# Patient Record
Sex: FEMALE | Race: WHITE | NOT HISPANIC OR LATINO | Employment: STUDENT | URBAN - METROPOLITAN AREA
[De-identification: names, ages, dates, MRNs, and addresses within clinical notes are randomized per-mention and may not be internally consistent; named-entity substitution may affect disease eponyms.]

---
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
## 2020-02-28 ENCOUNTER — OFFICE VISIT - HEALTHEAST (OUTPATIENT)
Dept: FAMILY MEDICINE | Facility: CLINIC | Age: 20
End: 2020-02-28

## 2020-02-28 DIAGNOSIS — L01.00 IMPETIGO: ICD-10-CM

## 2020-02-28 ASSESSMENT — MIFFLIN-ST. JEOR: SCORE: 1762.89

## 2020-10-12 ENCOUNTER — COMMUNICATION - HEALTHEAST (OUTPATIENT)
Dept: SCHEDULING | Facility: CLINIC | Age: 20
End: 2020-10-12

## 2020-10-12 DIAGNOSIS — Z30.019 ENCOUNTER FOR INITIAL PRESCRIPTION OF CONTRACEPTIVES, UNSPECIFIED CONTRACEPTIVE: ICD-10-CM

## 2021-03-22 ENCOUNTER — COMMUNICATION - HEALTHEAST (OUTPATIENT)
Dept: PHARMACY | Facility: CLINIC | Age: 21
End: 2021-03-22

## 2021-03-22 DIAGNOSIS — Z30.019 ENCOUNTER FOR INITIAL PRESCRIPTION OF CONTRACEPTIVES, UNSPECIFIED CONTRACEPTIVE: ICD-10-CM

## 2021-05-31 VITALS — BODY MASS INDEX: 26.22 KG/M2 | HEIGHT: 69 IN | WEIGHT: 177 LBS

## 2021-05-31 NOTE — TELEPHONE ENCOUNTER
Patient Returning Call  Reason for call:  Message from clinic  Information relayed to patient:  Message from Maureen Mac CNP sent at 8/22/2019 10:06 PM CDT -----  Your labs came back negative for celiac disease. Please let us know if symptoms worsen or fail to improve.   Patient has additional questions:  No  If YES, what are your questions/concerns:  n/a  Okay to leave a detailed message?: No call back needed

## 2021-05-31 NOTE — TELEPHONE ENCOUNTER
----- Message from Maureen Mac CNP sent at 8/22/2019 10:06 PM CDT -----  Your labs came back negative for celiac disease. Please let us know if symptoms worsen or fail to improve.

## 2021-05-31 NOTE — PROGRESS NOTES
Assessment/Plan:    1. Vomiting, intractability of vomiting not specified, presence of nausea not specified, unspecified vomiting type  Intermittent vomiting after eating over the last couple of months without any accompanying symptoms.  Exam today is unremarkable.  Discussed broad differentials with the patient.  She prefers to wait on any extensive work-up at this time and is requesting only a celiac test only today.  We will follow-up with patient regarding results when available.  Referral placed to gastroenterology for further evaluation in the event that symptoms do not improve.  - Celiac(Gluten)Antibody Panel  - Ambulatory referral to Gastroenterology  -Urine pregnancy    2. Encounter for initial prescription of contraceptives, unspecified contraceptive  Discussed options for contraceptive management.  She prefers combination oral contraceptive pills.  Discussed importance of safe sex practices.  Will obtain pregnancy test today.  - norgestimate-ethinyl estradiol (ORTHO TRI-CYCLEN LO, 28,) 0.18/0.215/0.25 mg-25 mcg Tab tablet; Take 1 tablet by mouth daily.  Dispense: 3 Package; Refill: 4  - Pregnancy (Beta-hCG, Qual), Urine    3. Healthcare maintenance  - HPV vaccine 9 valent 3 dose IM    Subjective:    Kelley Martin is a 18 year old female seen today for evaluation of vomiting after eating.  This is been happening intermittently for the last couple of months.  Vomiting is nonprojectile.  Between episodes, she is completely asymptomatic.  Over the last week symptoms seem to be getting a little bit better.  She is concerned about a possible food allergy.  Symptoms come on shortly after eating, within 1/2-hour typically.  She gets a slight pain in her abdomen and vomits shortly after.  Vomiting does relieve the symptoms.  She denies any symptoms of heartburn.  No fevers, chills or other systemic symptoms associated with it.  She has not vomited for the past week and feels that symptoms may be improving.   "She is requesting to be checked for celiac disease today.  She notices that symptoms have been more so with meals that are heavy and carbs.  She denies throwing up blood.  No change in her stools.  No diarrhea or constipation.  No lower abdominal pain.  Stools are not black.  No flank pain.  No recent travel.  No sick contacts with similar symptoms.  She denies any change in diet recently.    Patient would also like to discuss contraceptive options.  She is most interested in the birth control pill.  She will be going away to college.  Recently became sexually active.  She denies any chance for pregnancy and is currently on her period today.  Has not been on any contraception in the past.  Periods are regular for the most part.  No history of strokes in the family.  She does not smoke but occasionally vapes.  Per chart review, she is due for her third dose of HPV vaccine.  She is interested in getting this done today. Review of systems is as stated in HPI, and the remainder of the 10 system review is otherwise unremarkable.    Past Medical History, Family History, and Social History reviewed.    History reviewed. No pertinent surgical history.     No family history on file.     History reviewed. No pertinent past medical history.     Social History     Tobacco Use     Smoking status: Never Smoker     Smokeless tobacco: Never Used   Substance Use Topics     Alcohol use: No     Drug use: No        Current Outpatient Medications   Medication Sig Dispense Refill     norgestimate-ethinyl estradiol (ORTHO TRI-CYCLEN LO, 28,) 0.18/0.215/0.25 mg-25 mcg Tab tablet Take 1 tablet by mouth daily. 3 Package 4     No current facility-administered medications for this visit.           Objective:    Vitals:    08/20/19 1326   BP: 120/76   Patient Site: Left Arm   Patient Position: Sitting   Cuff Size: Adult Regular   Pulse: 76   Weight: 196 lb 3.2 oz (89 kg)   Height: 5' 9\" (1.753 m)      Body mass index is 28.97 " kg/m .      General Appearance:  Alert, cooperative, no distress, appears stated age   Neck: Supple, symmetrical, no adenopathy.   Lungs:   Clear to auscultation bilaterally, respirations unlabored.  No expiratory wheeze or inspiratory crackles noted.   Heart:  Regular rate and rhythm, S1, S2 normal, no murmur, rub or gallop   Abdomen:   Soft, non-tender, positive bowel sounds, no masses, no organomegaly   Skin: Warm, dry.  Skin color, texture, turgor normal, no rashes or lesions         This note has been dictated using voice recognition software. Any grammatical or context distortions are unintentional and inherent to the use of this software.

## 2021-06-02 VITALS — WEIGHT: 185.1 LBS | BODY MASS INDEX: 27.42 KG/M2 | HEIGHT: 69 IN

## 2021-06-02 VITALS — HEIGHT: 69 IN | WEIGHT: 183.4 LBS | BODY MASS INDEX: 27.16 KG/M2

## 2021-06-03 VITALS — HEIGHT: 69 IN | WEIGHT: 196.2 LBS | BODY MASS INDEX: 29.06 KG/M2

## 2021-06-04 VITALS
OXYGEN SATURATION: 100 % | DIASTOLIC BLOOD PRESSURE: 62 MMHG | BODY MASS INDEX: 30.32 KG/M2 | HEART RATE: 98 BPM | HEIGHT: 69 IN | WEIGHT: 204.7 LBS | SYSTOLIC BLOOD PRESSURE: 128 MMHG

## 2021-06-06 NOTE — PROGRESS NOTES
Assessment/Plan:    1. Impetigo  Lesion on chin is not classic appearing for impetigo however, given characteristics of some purulent drainage the other day, will treat with topical antibiotic as noted.  Recommend that she apply to affected area 3 times a day.  Also recommend gentle face wash and adequate handwashing.  She will follow-up if she develops any worsening sores or lesions.  - mupirocin (BACTROBAN) 2 % ointment; Apply to affected area 3 times daily  Dispense: 22 g; Refill: 0    Subjective:    Kelley Martin is a 19 year old female seen today for evaluation of a sore on her face.  She is accompanied by her mom.  Patient states that she developed a scab at looking sore about 5 days ago on her chin.  There was no injury or pimple that she noticed.  It hurt initially but now has scabbed over and is not bothering her as much.  2 days ago, the scab broke open and there was some purulent drainage and crusting.  She has not been putting any topical medication on it.  Has been using her normal make-up, face wash and lotion.  She denies any sores or lesions elsewhere on her body.  She does not typically have pimples or acne which is what concerned her about this.  It is occasionally itchy.  It blood initially when scab fell off but no bleeding since.  She denies any recent illness.  No fevers, chills or other systemic symptoms.  No close contacts with similar lesions. Review of systems is as stated in HPI, and the remainder of the 10 system review is otherwise unremarkable.    Past Medical History, Family History, and Social History reviewed.    No past surgical history on file.     No family history on file.     No past medical history on file.     Social History     Tobacco Use     Smoking status: Never Smoker     Smokeless tobacco: Never Used   Substance Use Topics     Alcohol use: No     Drug use: No        Current Outpatient Medications   Medication Sig Dispense Refill     norgestimate-ethinyl estradiol  "(ORTHO TRI-CYCLEN LO, 28,) 0.18/0.215/0.25 mg-25 mcg Tab tablet Take 1 tablet by mouth daily. 3 Package 4     mupirocin (BACTROBAN) 2 % ointment Apply to affected area 3 times daily 22 g 0     No current facility-administered medications for this visit.           Objective:    Vitals:    02/28/20 1522   BP: 128/62   Patient Site: Left Arm   Patient Position: Sitting   Cuff Size: Adult Regular   Pulse: 98   SpO2: 100%   Weight: 204 lb 11.2 oz (92.9 kg)   Height: 5' 9\" (1.753 m)      Body mass index is 30.23 kg/m .      General Appearance:  Alert, cooperative, no distress, appears stated age   HEENT:  Normal.  No acute findings.   Skin: Warm, dry.  Patient has a dry appearing lesion approximately size of a marble located on her chin.  It is somewhat crusty and dry in appearance.  No purulent drainage or bleeding noted at currently.  No tenderness.  No rashes or lesions elsewhere.           This note has been dictated using voice recognition software. Any grammatical or context distortions are unintentional and inherent to the use of this software.     "

## 2021-06-12 NOTE — TELEPHONE ENCOUNTER
Refill Approved    Rx renewed per Medication Renewal Policy. Medication was last renewed on 8/20/19.    [facility] Connection Triage/Med Refill 10/12/2020     Requested Prescriptions   Pending Prescriptions Disp Refills     norgestimate-ethinyl estradioL (ORTHO TRI-CYCLEN LO, 28,) 0.18/0.215/0.25 mg-25 mcg tablet 3 Package 4     Sig: Take 1 tablet by mouth daily.       Oral Contraceptives Protocol Passed - 10/12/2020  7:48 PM        Passed - Visit with PCP or prescribing provider visit in last 12 months      Last office visit with prescriber/PCP: Visit date not found OR same dept: Visit date not found OR same specialty: Visit date not found  Last physical: Visit date not found Last MTM visit: Visit date not found   Next visit within 3 mo: Visit date not found  Next physical within 3 mo: Visit date not found  Prescriber OR PCP: Denise Rodriguez RN  Last diagnosis associated with med order: 1. Encounter for initial prescription of contraceptives, unspecified contraceptive  - norgestimate-ethinyl estradioL (ORTHO TRI-CYCLEN LO, 28,) 0.18/0.215/0.25 mg-25 mcg tablet; Take 1 tablet by mouth daily.  Dispense: 3 Package; Refill: 4    If protocol passes may refill for 12 months if within 3 months of last provider visit (or a total of 15 months).

## 2021-06-15 ENCOUNTER — COMMUNICATION - HEALTHEAST (OUTPATIENT)
Dept: TELEHEALTH | Facility: CLINIC | Age: 21
End: 2021-06-15

## 2021-06-15 ENCOUNTER — OFFICE VISIT - HEALTHEAST (OUTPATIENT)
Dept: FAMILY MEDICINE | Facility: CLINIC | Age: 21
End: 2021-06-15

## 2021-06-15 DIAGNOSIS — Z00.00 HEALTH CARE MAINTENANCE: ICD-10-CM

## 2021-06-15 DIAGNOSIS — Z30.019 ENCOUNTER FOR INITIAL PRESCRIPTION OF CONTRACEPTIVES, UNSPECIFIED CONTRACEPTIVE: ICD-10-CM

## 2021-06-15 RX ORDER — NORGESTIMATE AND ETHINYL ESTRADIOL 7DAYSX3 LO
1 KIT ORAL DAILY
Qty: 3 PACKAGE | Refills: 3 | Status: SHIPPED | OUTPATIENT
Start: 2021-06-15 | End: 2022-05-14

## 2021-06-15 ASSESSMENT — MIFFLIN-ST. JEOR: SCORE: 1794.19

## 2021-06-16 PROBLEM — B27.90 MONONUCLEOSIS: Status: ACTIVE | Noted: 2019-02-05

## 2021-06-16 LAB
C TRACH DNA SPEC QL NAA+PROBE: NEGATIVE
SPECIMEN DESCRIPTION: NORMAL

## 2021-06-16 NOTE — TELEPHONE ENCOUNTER
Received refill request for Tri-Lo-Sprintec. Last seen over a year ago. Will send 3 month supply, then patient is due to see PCP.

## 2021-06-16 NOTE — TELEPHONE ENCOUNTER
Left message to call back for: PT  Information to relay to patient:  Left message to call and schedule ofv with dr bullard before next refill :TRI -LO SPRINTEC.

## 2021-06-17 NOTE — PATIENT INSTRUCTIONS - HE
Patient Instructions by Maureen Mac CNP at 2/5/2019  1:50 PM     Author: Maureen Mac CNP Service: -- Author Type: Nurse Practitioner    Filed: 2/5/2019  3:00 PM Encounter Date: 2/5/2019 Status: Addendum    : Maureen Mac CNP (Nurse Practitioner)    Related Notes: Original Note by Maureen Mac CNP (Nurse Practitioner) filed at 2/5/2019  2:56 PM       Patient Education   Patient Education     Mononucleosis  Mononucleosis (also called mono) is a contagious viral infection. Most infants and children exposed to the virus get only mild flu-like symptoms or no symptoms at all. However, infection is usually more serious in teens and young adults. While the virus is active it causes symptoms and can spread to others. After symptoms subside, the virus stays in the body and eventually becomes inactive. Once you have one case of mono, you are unlikely to develop symptoms again.  The virus is usually spread by contact with saliva, often by kissing. It may also spread by breastmilk, blood, or sexual contact. It takes about 4 to 6 weeks to develop symptoms after exposure.  Early symptoms include headache, nausea, tiredness and general muscle aching. This is followed by sore throat and fever. Lymph glands in the neck, under the arms, or in the groin may be swollen. Symptoms usually go away in about 1 to 2 months. But they can last up to four months.  If symptoms have been present less than 1 week or more than 3 weeks, the blood test used to diagnose this disease may be negative even though you have the illness.  In this case, other tests may be done.  Taking the antibiotics ampicillin or amoxicillin during a mono infection may cause a skin rash. This is not serious and will fade in about one week. The cause is a reaction of the drug with the virus.  Mono can cause your spleen to swell. The spleen is a fist-sized organ in the upper left abdomen that stores red blood cells. Injury to a swollen spleen can cause the  spleen to rupture. This can cause life-threatening internal bleeding. To avoid this, do not play contact sports or perform strenuous activity for 8 weeks, or until cleared by your healthcare provider. A sharp blow could rupture a swollen spleen  Home care    Rest in bed until the fever and weakness have gone away.    Drink plenty of fluids, but avoid alcohol. Otherwise, you may eat a regular diet.    Ask your healthcare provider about using over-the-counter medicines to treat symptoms such as fever, pain, or an itchy rash.    Over-the-counter throat lozenges may help soothe a sore throat. Gargling with warm salt water (1/2 teaspoon in 1 glass of warm water) may also be soothing to the throat.    You may return to work or school after the fever goes away and you are feeling better. Continue to follow any activity restrictions you have been given.  Preventing spread of the virus  To limit the spread of the virus, avoid exposing others to your saliva for at least 6 months after your illness (no kissing or sharing utensils, drinking glasses, or toothbrushes).  Follow-up care  Follow up with your healthcare provider within 1 to 2 weeks or as advised by our staff to be sure that there are no complications. If symptoms of extreme fatigue and swollen glands last longer than 6 months, see your healthcare provider for further testing.  When to seek medical advice  Call your healthcare provider right away if any of the following occur:    Excessive coughing    Yellow skin or eyes    Trouble swallowing  Call 911  Call 911 if any of the following occur:    Severe or worsening abdominal pain    Trouble breathing  Date Last Reviewed: 9/25/2015 2000-2017 [employer]. [address] [place] [address], [place] [address]. All rights reserved. This information is not intended as a substitute for professional medical care. Always follow your healthcare professional's instructions.

## 2021-06-18 ENCOUNTER — COMMUNICATION - HEALTHEAST (OUTPATIENT)
Dept: FAMILY MEDICINE | Facility: CLINIC | Age: 21
End: 2021-06-18

## 2021-06-18 NOTE — LETTER
Letter by Maureen Mac CNP at      Author: Maureen Mac CNP Service: -- Author Type: --    Filed:  Encounter Date: 3/11/2019 Status: (Other)       Kelley Martin  [address] [facility] [address]             March 11, 2019         Dear Ms. Martin,    Below are the results from your recent visit:    Resulted Orders   Chlamydia trachomatis & Neisseria gonorrhoeae, Amplified Detection   Result Value Ref Range    Chlamydia trachomatis, Amplified Detection Negative Negative    Neisseria gonorrhoeae, Amplified Detection Negative Negative       Chlamydia and Gonorrheae is negative.    Please call with questions or contact us using [employer].    Sincerely,        Electronically signed by Maureen Mac CNP

## 2021-06-18 NOTE — LETTER
Letter by Maureen Mac CNP at      Author: Maureen Mac CNP Service: -- Author Type: --    Filed:  Encounter Date: 2/5/2019 Status: (Other)       February 5, 2019     Patient: Kelley Martin   YOB: 2000   Date of Visit: 2/5/2019       To Whom it May Concern:    Kelley Martin was seen in my clinic on 2/5/2019. She has been diagnosed with mononucleosis.     If you have any questions or concerns, please don't hesitate to call.    Sincerely,         Electronically signed by Maureen Mac CNP

## 2021-06-23 NOTE — PROGRESS NOTES
Assessment/Plan:    1. Mononucleosis  Monospot is positive today.  Discussed with patient that mononucleosis is a contagious viral infection and typically requires only symptomatic cares.  Encouraged adequate rest and hydration.  Patient may continue to use throat lozenges, Tylenol and Advil for management of sore throat symptoms.  She should avoid any contact sports or any strenuous physical activity for 6 weeks due to possibility of spleen enlargement.  Discussed signs and symptoms of worsening illness to monitor for.  Patient should return to clinic if she develops any new or worsening symptoms.  She understands and is content with this plan.  - Mononucleosis Screen  - Rapid Strep A Screen-Throat  - Group A Strep, RNA Direct Detection, Throat    Subjective:    Kelley Martin is a 18-year-old female seen today for evaluation of a sore throat.  She is accompanied by her mom today.  [facility].  Rapid strep and throat culture were both negative. Patient feels that sore throat is getting worse over the last 4 days.  She has swollen lymph nodes on the side of her neck.  She is able to swallow without difficulty but it is causing a lot of discomfort.  She feels more fatigued than usual.  She denies any cough, shortness of breath or chest pain.  No fevers or chills.  No nasal congestion, ear pain.  She has been taking Tylenol and ibuprofen without much relief.  She denies any recent exposures to strep throat or mono.  She is interested in being tested for mono today.  No significant past medical history.  No additional concerns today. Review of systems is as stated in HPI, and the remainder of the 10 system review is otherwise unremarkable.    Past Medical History, Family History, and Social History reviewed.    History reviewed. No pertinent surgical history.     No family history on file.     History reviewed. No pertinent past medical history.  "    Social History     Tobacco Use     Smoking status: Never Smoker     Smokeless tobacco: Never Used   Substance Use Topics     Alcohol use: No     Drug use: No        No current outpatient medications on file.     No current facility-administered medications for this visit.           Objective:    Vitals:    02/05/19 1359   BP: 120/62   Patient Site: Right Arm   Patient Position: Sitting   Cuff Size: Adult Regular   Pulse: 90   Temp: 98.1  F (36.7  C)   SpO2: 99%   Weight: 183 lb 6.4 oz (83.2 kg)   Height: 5' 9\" (1.753 m)      Body mass index is 27.08 kg/m .      General Appearance:  Alert, cooperative, no distress, appears stated age   HEENT:   Moist mucous membranes.  Oropharynx with erythema and bilateral tonsillar exudate.  No nasal drainage or sinus tenderness to palpation.  Tympanic membranes pearly and translucent bilaterally.  No cough.    Neck:  Right-sided cervical lymphadenopathy present.   Lungs:   Clear to auscultation bilaterally, respirations unlabored.  No expiratory wheeze or inspiratory crackles noted.   Heart:  Regular rate and rhythm, S1, S2 normal, no murmur, rub or gallop   Abdomen:   Soft, non-tender, positive bowel sounds, no masses, no splenomegaly.   Extremities: Extremities normal.  No cyanosis or edema   Skin: Warm, dry.  Skin color, texture, turgor normal, no rashes or lesions         This note has been dictated using voice recognition software. Any grammatical or context distortions are unintentional and inherent to the use of this software.     "

## 2021-06-24 NOTE — PROGRESS NOTES
Assessment:      Satisfactory school sports physical exam.       Plan:      Permission granted to participate in athletics without restrictions. Form signed and returned to patient.  Anticipatory guidance: Specific topics reviewed: bicycle helmets, drugs, ETOH, and tobacco, importance of regular dental care, importance of regular exercise, importance of varied diet, limit TV, media violence, minimize junk food, seat belts and sex; STD and pregnancy prevention.      1. Routine general medical examination at a health care facility  - HPV vaccine 9 valent 3 dose IM  - Chlamydia trachomatis & Neisseria gonorrhoeae, Amplified Detection    Subjective:       Kelley Martin is a 18 y.o. female who presents for a school sports physical exam. Patient/parent deny any current health related concerns.  She plans to participate in softball.  Patient has played softball years ago. Denies any history of head injuries or concussions.  No family history of early cardiac sudden death.  Patient was diagnosed with mono over a month ago.  Reports that she has been feeling better, no continued symptoms of fatigue.  No significant past medical history otherwise.  She does not take any regular medications.  She denies having any other symptoms or concerns today.    Immunization History   Administered Date(s) Administered     DTaP, historic 01/11/2001, 03/08/2001, 05/24/2001, 05/16/2002, 06/07/2006     HPV 9 Valent 08/01/2017     Hep A, historic 07/08/2013, 08/12/2015     Hep B, historic 01/11/2001, 03/08/2001, 05/24/2001     HiB, historic,unspecified 01/11/2001, 03/08/2001, 05/24/2001, 01/17/2002     IPV 01/11/2001, 03/08/2001, 05/24/2001, 06/07/2006     Influenza G4x5-53, 12/18/2009     Influenza, inj, historic,unspecified 11/30/2007, 11/26/2008     Influenza, seasonal,quad inj 6-35 mos 11/30/2007     MMR 01/17/2002, 06/07/2006     Meningococcal MCV4 Conjugate,Unspecified 07/18/2013     Meningococcal MCV4P 07/08/2013, 08/01/2017      "Pneumo Conj 7-V(before 2010) 07/18/2002, 11/07/2002     Tdap 06/12/2013     Varicella 01/17/2002, 08/22/2013       The following portions of the patient's history were reviewed and updated as appropriate: allergies, current medications, past family history, past medical history, past social history, past surgical history and problem list.    Review of Systems  Pertinent items are noted in HPI      Objective:      /60 (Patient Site: Right Arm, Patient Position: Sitting, Cuff Size: Adult Regular)   Pulse 80   Ht 5' 9\" (1.753 m)   Wt 185 lb 1.6 oz (84 kg)   BMI 27.33 kg/m      General Appearance:  Alert, cooperative, no distress, appropriate for age                             Head:  Normocephalic, without obvious abnormality                              Eyes:  PERRL, EOM's intact, conjunctiva and cornea clear, fundi benign, both eyes                              Ears:  TM pearly gray color and semitransparent, external ear canals normal, both ears                             Nose:  Nares symmetrical, septum midline, mucosa pink, clear watery discharge; no    sinus tenderness                           Throat:  Lips, tongue, and mucosa are moist, pink, and intact; teeth intact                              Neck:  Supple; symmetrical, trachea midline, no adenopathy; thyroid: no enlargement,    symmetric, no tenderness/mass/nodules; no carotid bruit, no JVD                              Back:  Symmetrical, no curvature, ROM normal, no CVA tenderness                Chest/Breast:  No mass, tenderness, or discharge                            Lungs:  Clear to auscultation bilaterally, respirations unlabored                              Heart:  Normal PMI, regular rate & rhythm, S1 and S2 normal, no murmurs, rubs, or    gallops                      Abdomen:  Soft, non-tender, bowel sounds active all four quadrants, no mass or     organomegaly               Genitourinary:  Genitalia intact, no discharge, swelling, or " pain          Musculoskeletal:  Tone and strength strong and symmetrical, all extremities; no joint pain or    edema                                        Lymphatic:  No adenopathy              Skin/Hair/Nails:  Skin warm, dry and intact, no rashes or abnormal dyspigmentation                    Neurologic:  Alert and oriented x3, no cranial nerve deficits, normal strength and tone, gait    steady

## 2021-06-26 NOTE — TELEPHONE ENCOUNTER
----- Message from Maureen Mac CNP sent at 6/18/2021  9:20 AM CDT -----  Please notify patient that her chlamydia screen came back negative.

## 2021-06-26 NOTE — PROGRESS NOTES
"    Assessment & Plan     Encounter for initial prescription of contraceptives, unspecified contraceptive  Doing well overall on oral contraceptive pills.  We reviewed other options for birth control as well.  At this point, patient prefers to stay on contraceptive pills.  Refill provided today.  Discussed safe sex practices as well.  She will be due for first Pap smear next year, I encouraged her to schedule annual exam.  Will obtain routine screening for chlamydia today.   - norgestimate-ethinyl estradioL (ORTHO TRI-CYCLEN LO, 28,) 0.18/0.215/0.25 mg-25 mcg tablet  Dispense: 3 Package; Refill: 3    Health care maintenance  - Chlamydia trachomatis by PCR     BMI:   Estimated body mass index is 31.25 kg/m  as calculated from the following:    Height as of this encounter: 5' 9\" (1.753 m).    Weight as of this encounter: 211 lb 9.6 oz (96 kg).   The following high BMI interventions were performed this visit: encouragement to exercise and lifestyle education regarding diet    Return in about 6 months (around 12/15/2021) for Annual Physical Exam.    Maureen Mac, CNP  [facility]   Kelley Martin is 20 y.o. and presents today for the following health issues   HPI   Patient is here today for medication check and follow-up.  She is on oral contraceptive pills.  Doing well however feels that she may have gained some weight since starting the contraceptive pills.  She also notes that her diet has not been the best lately and she is not exercising routinely.  She is hoping to start working on these lifestyle modifications.  She has considered other forms of contraception in the past but is not interested in IUD, Nexplanon or Depo-Provera at this time.  She has been sexually active with one partner but recently broke up with this boyfriend.  He is due for her annual exam and will be due for first Pap smear next year.  She does not have any additional concerns today.    She does not " "smoke cigarettes.  No personal or family history of strokes that she is aware of.    Review of Systems  Review of Systems - pertinent positives noted in HPI, otherwise ROS is negative.        Objective    /64   Pulse 86   Temp 97.1  F (36.2  C) (Tympanic)   Ht 5' 9\" (1.753 m)   Wt 211 lb 9.6 oz (96 kg)   BMI 31.25 kg/m    Body mass index is 31.25 kg/m .  Physical Exam    General Appearance:  Alert, cooperative, no distress, appears stated age   Lungs:   Clear to auscultation bilaterally, respirations unlabored.    Heart:  Regular rate and rhythm, S1, S2 normal, no murmur, rub or gallop   Skin: Warm, dry.                    "

## 2021-07-06 VITALS
HEIGHT: 69 IN | TEMPERATURE: 97.1 F | HEART RATE: 86 BPM | SYSTOLIC BLOOD PRESSURE: 116 MMHG | BODY MASS INDEX: 31.34 KG/M2 | WEIGHT: 211.6 LBS | DIASTOLIC BLOOD PRESSURE: 64 MMHG

## 2021-11-12 ENCOUNTER — TELEPHONE (OUTPATIENT)
Dept: FAMILY MEDICINE | Facility: CLINIC | Age: 21
End: 2021-11-12
Payer: COMMERCIAL

## 2021-11-12 NOTE — TELEPHONE ENCOUNTER
rita- pt has apt with Aleta on Monday and we need to cancel it because she will not be in clinic. I cancelled the apt. When pt calls back we can do a virtual visit if she would like

## 2021-11-23 ENCOUNTER — OFFICE VISIT (OUTPATIENT)
Dept: FAMILY MEDICINE | Facility: CLINIC | Age: 21
End: 2021-11-23
Payer: COMMERCIAL

## 2021-11-23 VITALS
HEART RATE: 64 BPM | DIASTOLIC BLOOD PRESSURE: 62 MMHG | SYSTOLIC BLOOD PRESSURE: 120 MMHG | HEIGHT: 69 IN | BODY MASS INDEX: 25.21 KG/M2 | OXYGEN SATURATION: 100 % | WEIGHT: 170.2 LBS

## 2021-11-23 DIAGNOSIS — R11.2 NAUSEA AND VOMITING, INTRACTABILITY OF VOMITING NOT SPECIFIED, UNSPECIFIED VOMITING TYPE: Primary | ICD-10-CM

## 2021-11-23 LAB
ALBUMIN SERPL-MCNC: 4 G/DL (ref 3.5–5)
ALP SERPL-CCNC: 48 U/L (ref 45–120)
ALT SERPL W P-5'-P-CCNC: 23 U/L (ref 0–45)
ANION GAP SERPL CALCULATED.3IONS-SCNC: 14 MMOL/L (ref 5–18)
AST SERPL W P-5'-P-CCNC: 20 U/L (ref 0–40)
BILIRUB SERPL-MCNC: 0.4 MG/DL (ref 0–1)
BUN SERPL-MCNC: 9 MG/DL (ref 8–22)
CALCIUM SERPL-MCNC: 9.6 MG/DL (ref 8.5–10.5)
CHLORIDE BLD-SCNC: 107 MMOL/L (ref 98–107)
CO2 SERPL-SCNC: 20 MMOL/L (ref 22–31)
CREAT SERPL-MCNC: 0.69 MG/DL (ref 0.6–1.1)
GFR SERPL CREATININE-BSD FRML MDRD: >90 ML/MIN/1.73M2
GLUCOSE BLD-MCNC: 70 MG/DL (ref 70–125)
LIPASE SERPL-CCNC: 19 U/L (ref 0–52)
POTASSIUM BLD-SCNC: 3.7 MMOL/L (ref 3.5–5)
PROT SERPL-MCNC: 7.4 G/DL (ref 6–8)
SODIUM SERPL-SCNC: 141 MMOL/L (ref 136–145)
TSH SERPL DL<=0.005 MIU/L-ACNC: 1.04 UIU/ML (ref 0.3–5)

## 2021-11-23 PROCEDURE — 84443 ASSAY THYROID STIM HORMONE: CPT | Performed by: FAMILY MEDICINE

## 2021-11-23 PROCEDURE — 83690 ASSAY OF LIPASE: CPT | Performed by: FAMILY MEDICINE

## 2021-11-23 PROCEDURE — 80053 COMPREHEN METABOLIC PANEL: CPT | Performed by: FAMILY MEDICINE

## 2021-11-23 PROCEDURE — 99213 OFFICE O/P EST LOW 20 MIN: CPT | Performed by: FAMILY MEDICINE

## 2021-11-23 PROCEDURE — 36415 COLL VENOUS BLD VENIPUNCTURE: CPT | Performed by: FAMILY MEDICINE

## 2021-11-23 ASSESSMENT — MIFFLIN-ST. JEOR: SCORE: 1601.4

## 2021-11-23 NOTE — PROGRESS NOTES
"Kelley was seen today for gi problem.    Diagnoses and all orders for this visit:    Nausea and vomiting, intractability of vomiting not specified, unspecified vomiting type  -     Comprehensive metabolic panel (BMP + Alb, Alk Phos, ALT, AST, Total. Bili, TP)  -     TSH with free T4 reflex  -     Lipase      Patient Instructions   We will check lab work.    Consider an upper GI scope exam and / or gastroenterology referral.    I recommend taking georgie to help with nausea/ vomiting - [email or web address] - Take one twice a day.       SUBJECTIVE: Kelley Martin is a 21 year old female who presents with the following:  Patient presents with:  GI Problem: Vomits after eating for over a year ago  She will vomit after eating for the last year.  Occurs once a day.  No nausea.  She will eat and feel like she needs to vomit but can wait at times.  Usually just vomits once.  No obvious foods.  She was tested for celiac disease which was negative.  She recently lost a lot of weight.  Has been working out more.    ROS: No heartburn.  No abdominal pain.  No dysphagia/ odontophagia.  No constipation / diarrhea.  No anxiety.    SH: Lives with roommates.  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  Studies marketing.    Diet: B- toast / sausage / water.  L- peanut butter and jelly with crackers / snack food.  Snack- smoothie with fruit/ juice.  D- tacos/ pizza / pasta / chicken.  Caffeine - Bucked Up ( energy drink ).  Alcohol - 2 drinks / weeks.  No drug use.    Supplement: Fat burner supplement      OBJECTIVE: /62 (BP Location: Left arm, Patient Position: Sitting, Cuff Size: Adult Regular)   Pulse 64   Ht 1.753 m (5' 9\")   Wt 77.2 kg (170 lb 3.2 oz)   SpO2 100%   BMI 25.13 kg/m   no distress  Lungs: Clear to auscultation.  No retractions or tachypnea.  CV: RRR. S1 and S2 normal.  No murmurs, rubs or gallops.  Abdomen: Soft. NT. ND. No HSM or masses.    Answers for HPI/ROS submitted by the patient on " 11/23/2021  How many servings of fruits and vegetables do you eat daily?: 0-1  On average, how many sweetened beverages do you drink each day (Examples: soda, juice, sweet tea, etc.  Do NOT count diet or artificially sweetened beverages)?: 1  How many minutes a day do you exercise enough to make your heart beat faster?: 60 or more  How many days a week do you exercise enough to make your heart beat faster?: 7  How many days per week do you miss taking your medication?: 0    Liana Horton MD

## 2021-11-23 NOTE — PATIENT INSTRUCTIONS
We will check lab work.    Consider an upper GI scope exam and / or gastroenterology referral.    I recommend taking georgie to help with nausea/ vomiting - [email or web address] - Take one twice a day.

## 2021-11-24 ENCOUNTER — TELEPHONE (OUTPATIENT)
Dept: FAMILY MEDICINE | Facility: CLINIC | Age: 21
End: 2021-11-24
Payer: COMMERCIAL

## 2021-11-24 NOTE — TELEPHONE ENCOUNTER
----- Message from Liana Horton MD sent at 11/24/2021 10:17 AM CST -----  Notify pt that her labs look ok.  Please have her update me on her decision regarding scope exam/ referral to GI.

## 2021-11-24 NOTE — TELEPHONE ENCOUNTER
Left message to call back for: Kelley  Information to relay to patient: Please notify patient of Dr. Horton message. Thank you.

## 2021-11-24 NOTE — RESULT ENCOUNTER NOTE
Notify pt that her labs look ok.  Please have her update me on her decision regarding scope exam/ referral to GI.

## 2022-01-21 ENCOUNTER — OFFICE VISIT (OUTPATIENT)
Dept: FAMILY MEDICINE | Facility: CLINIC | Age: 22
End: 2022-01-21
Payer: COMMERCIAL

## 2022-01-21 VITALS
BODY MASS INDEX: 24.14 KG/M2 | TEMPERATURE: 98.5 F | SYSTOLIC BLOOD PRESSURE: 110 MMHG | HEART RATE: 75 BPM | WEIGHT: 163.5 LBS | DIASTOLIC BLOOD PRESSURE: 72 MMHG | OXYGEN SATURATION: 98 %

## 2022-01-21 DIAGNOSIS — R53.83 FATIGUE, UNSPECIFIED TYPE: ICD-10-CM

## 2022-01-21 DIAGNOSIS — J02.9 SORE THROAT: Primary | ICD-10-CM

## 2022-01-21 DIAGNOSIS — R74.8 ELEVATED LIVER ENZYMES: ICD-10-CM

## 2022-01-21 LAB
ALBUMIN SERPL-MCNC: 2.9 G/DL (ref 3.5–5)
ALP SERPL-CCNC: 158 U/L (ref 45–120)
ALT SERPL W P-5'-P-CCNC: 298 U/L (ref 0–45)
ANION GAP SERPL CALCULATED.3IONS-SCNC: 11 MMOL/L (ref 5–18)
AST SERPL W P-5'-P-CCNC: 141 U/L (ref 0–40)
BILIRUB SERPL-MCNC: 4.9 MG/DL (ref 0–1)
BUN SERPL-MCNC: 7 MG/DL (ref 8–22)
CALCIUM SERPL-MCNC: 9 MG/DL (ref 8.5–10.5)
CHLORIDE BLD-SCNC: 105 MMOL/L (ref 98–107)
CO2 SERPL-SCNC: 23 MMOL/L (ref 22–31)
CREAT SERPL-MCNC: 0.71 MG/DL (ref 0.6–1.1)
DEPRECATED S PYO AG THROAT QL EIA: NEGATIVE
ERYTHROCYTE [DISTWIDTH] IN BLOOD BY AUTOMATED COUNT: 13.2 % (ref 10–15)
GFR SERPL CREATININE-BSD FRML MDRD: >90 ML/MIN/1.73M2
GLUCOSE BLD-MCNC: 66 MG/DL (ref 70–125)
GROUP A STREP BY PCR: NOT DETECTED
HCT VFR BLD AUTO: 34.4 % (ref 35–47)
HGB BLD-MCNC: 11.5 G/DL (ref 11.7–15.7)
MCH RBC QN AUTO: 31.3 PG (ref 26.5–33)
MCHC RBC AUTO-ENTMCNC: 33.4 G/DL (ref 31.5–36.5)
MCV RBC AUTO: 94 FL (ref 78–100)
MONOCYTES NFR BLD AUTO: NEGATIVE %
PLATELET # BLD AUTO: 165 10E3/UL (ref 150–450)
POTASSIUM BLD-SCNC: 4 MMOL/L (ref 3.5–5)
PROT SERPL-MCNC: 6.2 G/DL (ref 6–8)
RBC # BLD AUTO: 3.67 10E6/UL (ref 3.8–5.2)
SODIUM SERPL-SCNC: 139 MMOL/L (ref 136–145)
WBC # BLD AUTO: 3.5 10E3/UL (ref 4–11)

## 2022-01-21 PROCEDURE — 86308 HETEROPHILE ANTIBODY SCREEN: CPT | Performed by: NURSE PRACTITIONER

## 2022-01-21 PROCEDURE — 85027 COMPLETE CBC AUTOMATED: CPT | Performed by: NURSE PRACTITIONER

## 2022-01-21 PROCEDURE — 36415 COLL VENOUS BLD VENIPUNCTURE: CPT | Performed by: NURSE PRACTITIONER

## 2022-01-21 PROCEDURE — 86803 HEPATITIS C AB TEST: CPT | Performed by: NURSE PRACTITIONER

## 2022-01-21 PROCEDURE — 86706 HEP B SURFACE ANTIBODY: CPT | Performed by: NURSE PRACTITIONER

## 2022-01-21 PROCEDURE — 87651 STREP A DNA AMP PROBE: CPT | Performed by: NURSE PRACTITIONER

## 2022-01-21 PROCEDURE — 99214 OFFICE O/P EST MOD 30 MIN: CPT | Performed by: NURSE PRACTITIONER

## 2022-01-21 PROCEDURE — 80053 COMPREHEN METABOLIC PANEL: CPT | Performed by: NURSE PRACTITIONER

## 2022-01-21 PROCEDURE — 87340 HEPATITIS B SURFACE AG IA: CPT | Performed by: NURSE PRACTITIONER

## 2022-01-21 PROCEDURE — 86704 HEP B CORE ANTIBODY TOTAL: CPT | Performed by: NURSE PRACTITIONER

## 2022-01-21 RX ORDER — FAMOTIDINE 20 MG/1
20 TABLET, FILM COATED ORAL
COMMUNITY
Start: 2022-01-17

## 2022-01-21 RX ORDER — SUCRALFATE 1 G/1
1 TABLET ORAL
COMMUNITY
Start: 2022-01-17

## 2022-01-21 NOTE — PROGRESS NOTES
"  Assessment & Plan     Sore throat  - Mononucleosis screen  - Group A Streptococcus PCR Throat Swab    Fatigue, unspecified type  - Comprehensive metabolic panel (BMP + Alb, Alk Phos, ALT, AST, Total. Bili, TP)  - CBC with platelets    Rapid strep and mono negative. WBCs mildly low, which could be consistent with a viral illness.  CMP pending.  I suspect patient may be having some prolonged COVID symptoms or a new viral illness.  Also discussed that her chronic vomiting, recent endoscopy, and GERD could also be contributing to her sore throat symptoms.  Check bilirubin and liver enzymes due to concerns regarding possible jaundice. I do not appreciate any significant discoloring of the sclera.  At this point, I would continue with rest and fluids.  Recommendations per GI for her gastritis and GERD.       BMI:   Estimated body mass index is 24.14 kg/m  as calculated from the following:    Height as of 11/23/21: 1.753 m (5' 9\").    Weight as of this encounter: 74.2 kg (163 lb 8 oz).       Return in about 4 weeks (around 2/18/2022), or if symptoms worsen or fail to improve.    Edith Martin NP  [facility]    Amanda Benson is a 21 year old who presents with complaints of a sore throat.  Symptoms originally started about 1 month ago when she was diagnosed with COVID.  At that time, she had a sore throat, chills, congestion, and loss of appetite.  Most of those symptoms have resolved except for the fatigue and sore throat.  She denies any difficulty swallowing.  She is not having any fevers.  Patient did have an endoscopy 4 days ago due to some chronic vomiting symptoms that she has had for the last year.  She gets a lot of nausea with this as well.  I was able to see her pathology results which showed reflux esophagitis, chronic gastritis, and increased intraepithelial lymphocytes.  Her H. pylori testing was negative.  Patient does not think the sore throat symptoms were present " prior to her COVID illness.  She is taking Pepcid and still Carafate.  She also feels like the whites of her eyes have looked a little bit yellow.  She denies any abdominal pain.    Review of Systems   Pertinent items in HPI      Objective    /72   Pulse 75   Temp 98.5  F (36.9  C)   Wt 74.2 kg (163 lb 8 oz)   SpO2 98%   BMI 24.14 kg/m    Body mass index is 24.14 kg/m .  Physical Exam   GENERAL: healthy, alert and no distress  HENT: ear canals and TM's normal, nose and mouth without ulcers or lesions  NECK: no adenopathy, no asymmetry, masses, or scars and thyroid normal to palpation  RESP: lungs clear to auscultation - no rales, rhonchi or wheezes  CV: regular rate and rhythm, normal S1 S2, no S3 or S4, no murmur, click or rub, no peripheral edemastrong  ABDOMEN: soft, nontender, no hepatosplenomegaly, no masses and bowel sounds normal

## 2022-01-25 ENCOUNTER — TELEPHONE (OUTPATIENT)
Dept: FAMILY MEDICINE | Facility: CLINIC | Age: 22
End: 2022-01-25
Payer: COMMERCIAL

## 2022-01-25 DIAGNOSIS — R74.8 ELEVATED LIVER ENZYMES: Primary | ICD-10-CM

## 2022-01-25 LAB
HBV SURFACE AB SERPLBLD QL IA.RAPID: NEGATIVE
HBV SURFACE AG SERPL QL IA: NONREACTIVE
HCV AB SERPL QL IA: NEGATIVE

## 2022-01-25 NOTE — TELEPHONE ENCOUNTER
Patient returning call. Told pt provider was on the phone will call back when able. Ok to leave detailed msg per pt.

## 2022-01-25 NOTE — TELEPHONE ENCOUNTER
Call to patient regarding her labs    LFT elevated.  Negative mono, strep.  Mildly decreased WBCs.    Patient has not been taking any acetaminophen.  Since I saw her last, her sore throat and nausea/vomiting is much better.  She denies any fevers or abdominal pain.    I have added some hepatitis testing onto her existing blood work.  [place].    Since she is feeling better, we will plan on rechecking her labs when she returns to [place] next week.  I placed lab orders for this.  Patient will call and schedule lab only appointment.    If her liver enzymes are still elevated at recheck, will send for abdominal ultrasound.    Patient verbalizes understanding.

## 2022-01-26 LAB — HBV CORE AB SERPL QL IA: NEGATIVE

## 2022-01-26 NOTE — TELEPHONE ENCOUNTER
Reason for Call:  Other faxing orders    Detailed comments: Mother calling in for patient to have pt's lab orders faxed to [facility] in [place] at [phone].    Phone Number Patient can be reached at: Cell number on file:    Telephone Information:   Mobile [phone]       Best Time: any    Can we leave a detailed message on this number? YES    Call taken on 1/26/2022 at 2:07 PM by Carlito Garcia

## 2022-01-27 NOTE — TELEPHONE ENCOUNTER
Please call patient.    Lab orders faxed.  Let me know if there are any issues.    Edith Martin NP

## 2022-02-10 ENCOUNTER — TELEPHONE (OUTPATIENT)
Dept: FAMILY MEDICINE | Facility: CLINIC | Age: 22
End: 2022-02-10
Payer: COMMERCIAL

## 2022-02-10 DIAGNOSIS — R74.8 ELEVATED LIVER ENZYMES: Primary | ICD-10-CM

## 2022-02-10 NOTE — TELEPHONE ENCOUNTER
Reason for Call:  Other call back    Detailed comments: Results - Patient would like to know the results from [place] that she had on 02/04/2022 - looks like its been imported from [place]. Please advise.     Phone Number Patient can be reached at: Home number on file [phone] (home)    Best Time: any    Can we leave a detailed message on this number? YES    Call taken on 2/10/2022 at 2:31 PM by Carlito Garcia

## 2022-02-15 ENCOUNTER — TELEPHONE (OUTPATIENT)
Dept: FAMILY MEDICINE | Facility: CLINIC | Age: 22
End: 2022-02-15
Payer: COMMERCIAL

## 2022-02-15 DIAGNOSIS — R74.8 ELEVATED LIVER ENZYMES: ICD-10-CM

## 2022-02-15 NOTE — TELEPHONE ENCOUNTER
Call to patient.    Reviewed labs with her. Liver enzymes and bilirubin look significantly better. WBC normalized as well.    Patient reports she is feeling well. No vomiting or abdominal pain. Yellowing of sclera has improved.    Recommend rechecking again in March to make sure labs have completely normalized.  Will send orders to [facility].    Patient verbalizes understanding and has no other questions.    Edith Martin, NP

## 2022-02-15 NOTE — TELEPHONE ENCOUNTER
Reason for Call: Request for an order or referral: lab orders need to have a diagnosis.    Order or referral being requested: lab orders for BMP    Date needed: as soon as possible    Has the patient been seen by the PCP for this problem? YES    Additional comments: Kellee @ [facility] called and stated patient is there at the lab to have lab work done (orders from Edith Martin)  but the lab orders do not have a diagnosis on it. Kellee states orders have to have a diagnosis. Please call Kellee to discuss her questions/concerns.    Best Time: ANY    Can we leave a detailed message on this number?  YES    Call taken on 2/15/2022 at 1:13 PM by Charito Martinez

## 2022-05-12 DIAGNOSIS — Z30.019 ENCOUNTER FOR INITIAL PRESCRIPTION OF CONTRACEPTIVES, UNSPECIFIED CONTRACEPTIVE: ICD-10-CM

## 2022-05-14 RX ORDER — NORGESTIMATE AND ETHINYL ESTRADIOL
KIT
Qty: 84 TABLET | Refills: 2 | Status: SHIPPED | OUTPATIENT
Start: 2022-05-14 | End: 2023-01-26

## 2022-05-14 NOTE — TELEPHONE ENCOUNTER
"Last Written Prescription Date:  6/15/21  Last Fill Quantity: 3,  # refills: 3   Last office visit provider:  1/21/22     Requested Prescriptions   Pending Prescriptions Disp Refills     TRI-LO-SPRINTEC 0.18/0.215/0.25 MG-25 MCG tablet [Pharmacy Med Name: TRI-LO-SPRINTEC TABLETS 28S] 84 tablet      Sig: TAKE 1 TABLET BY MOUTH DAILY       Contraceptives Protocol Passed - 5/12/2022  3:16 AM        Passed - Patient is not a current smoker if age is 35 or older        Passed - Recent (12 mo) or future (30 days) visit within the authorizing provider's specialty     Patient has had an office visit with the authorizing provider or a provider within the authorizing providers department within the previous 12 mos or has a future within next 30 days. See \"Patient Info\" tab in inbasket, or \"Choose Columns\" in Meds & Orders section of the refill encounter.              Passed - Medication is active on med list        Passed - No active pregnancy on record        Passed - No positive pregnancy test in past 12 months             Constance Garcia 05/14/22 8:23 AM  "

## 2022-07-07 ENCOUNTER — NURSE TRIAGE (OUTPATIENT)
Dept: NURSING | Facility: CLINIC | Age: 22
End: 2022-07-07

## 2022-07-07 NOTE — TELEPHONE ENCOUNTER
"Nurse Triage SBAR    Is this a 2nd Level Triage? NO    Situation: Patient calling with Break[facility] bleeding while on birth control pills. .  Consent: not needed    Background: Pt states she is on birth control pills right now.  Last time she got her period, it came a week earlier than she was supposed to get it.   In between that time and the next time she was supposed to get it - she got it again (about 10 days later).      Assessment:   * Normal period but a week early and then again a week and a half later.    * Periods are normally really regular.  Pt states \"I sometimes get them 3-4 days early, but getting it a week early and then a week and a half later again is not normal\".  The initial period that I got a week early did last longer too.    * Pt states she had some cramping when it was heavier this last time, but normally I don't get cramps.   * Denies passing any tissue or blood clots.    *  Denies taking blood thinners  * Denies feeling weak/light headed.    * Pt states she has been taking this brand of birth control for 3+ years.       Protocol Recommended Disposition:   Home Care.     Recommendation: Advised patient to do home care. Home care reviewed.  . Reviewed concerning symptoms and when to call back.     Heidy Aguiar RN [facility] 7/7/2022 11:33 AM    COVID 19 Nurse Triage Plan/Patient Instructions    Please be aware that novel coronavirus (COVID-19) may be circulating in the community. If you develop symptoms such as fever, cough, or SOB or if you have concerns about the presence of another infection including coronavirus (COVID-19), please contact your health care provider or visit [email or web address].     Disposition/Instructions    Home care recommended. Follow home care protocol based instructions.    Thank you for taking steps to prevent the spread of this virus.  o Limit your contact with others.  o Wear a simple mask to cover your cough.  o Wash your hands " well and often.    Resources    [facility]: About COVID-19: [email or web address]    CDC: What to Do If You're Sick: www.cdc.gov/coronavirus/2019-ncov/about/steps-when-sick.html    CDC: Ending Home Isolation: www.cdc.gov/coronavirus/2019-ncov/hcp/disposition-in-home-patients.html     CDC: Caring for Someone: www.cdc.gov/coronavirus/2019-ncov/if-you-are-sick/care-for-someone.html     [facility]: Interim Guidance for Hospital Discharge to Home: www.[facility].Count includes the [facility].[place]./diseases/coronavirus/hcp/hospdischarge.pdf    [facility] clinical trials (COVID-19 research studies): clinicalaffairs.[facility]-clinical-trials     Below are the COVID-19 hotlines at the [place] Department [facility] ([facility]). Interpreters are available.   o For health questions: Call 219-502-5914 or 1-991.310.2659 (7 a.m. to 7 p.m.)  o For questions about schools and childcare: Call 974-265-9923 or 1-392.664.6182 (7 a.m. to 7 p.m.)                         Reason for Disposition    Taking birth control pills and hasn't missed taking any pills    Additional Information    Negative: SEVERE vaginal bleeding (e.g., soaking 2 pads or tampons per hour and present 2 or more hours; 1 menstrual cup every 2 hours)    Negative: MODERATE vaginal bleeding (e.g., soaking pad or tampon per hour and present > 6 hours; 1 menstrual cup every 6 hours)    Negative: Pale skin (pallor) of new onset or worsening    Negative: Constant abdominal pain lasting > 2 hours    Negative: Patient sounds very sick or weak to the triager    Negative: Taking Coumadin (warfarin) or other strong blood thinner, or known bleeding disorder (e.g., thrombocytopenia)    Negative: Skin bruises or nosebleed and not caused by an injury    Negative: Bleeding/spotting after procedure (e.g., biopsy) or pelvic examination (e.g., pap smear) that persists > 3 days    Negative: Patient wants to be seen    Negative: Passed tissue (e.g., gray-white)    Negative: Periods with > 6 soaked  pads or tampons per day    Negative: Periods last > 7 days    Negative: Uses menstrual cups and more than 80 ml blood per menstrual period    Negative: Missed period has occurred 2 or more times in the last year and the cause is not known    Negative: Menstrual cycle < 21 days OR > 35 days, and occurs more than two cycles (2 months) this past year    Negative: Bleeding or spotting between regular periods occurs more than three cycles (3 months) this past year    Negative: Bleeding or spotting between regular periods occurs more than three cycles (3 months), and using birth control medicine (e.g., pills, patch, Depo-Provera, Implanon, vaginal ring, Mirena IUD)    Negative: Bleeding or spotting occurs after hysterectomy    Negative: Age > 39 years with irregular or excessive bleeding    Negative: Bleeding or spotting occurs after sex (Exception: first intercourse)    Protocols used: VAGINAL BLEEDING - [id number]

## 2022-09-23 ENCOUNTER — TRANSFERRED RECORDS (OUTPATIENT)
Dept: HEALTH INFORMATION MANAGEMENT | Facility: CLINIC | Age: 22
End: 2022-09-23

## 2022-09-30 ENCOUNTER — VIRTUAL VISIT (OUTPATIENT)
Dept: FAMILY MEDICINE | Facility: CLINIC | Age: 22
End: 2022-09-30
Payer: COMMERCIAL

## 2022-09-30 DIAGNOSIS — F41.1 GAD (GENERALIZED ANXIETY DISORDER): Primary | ICD-10-CM

## 2022-09-30 PROCEDURE — 99214 OFFICE O/P EST MOD 30 MIN: CPT | Mod: TEL | Performed by: PHYSICIAN ASSISTANT

## 2022-09-30 RX ORDER — FLUOXETINE 10 MG/1
CAPSULE ORAL
Qty: 74 CAPSULE | Refills: 0 | Status: SHIPPED | OUTPATIENT
Start: 2022-09-30 | End: 2022-12-21

## 2022-09-30 NOTE — PROGRESS NOTES
"Kelley is a 21 year old who is being evaluated via a billable telephone visit.      What phone number would you like to be contacted at? See demo  How would you like to obtain your AVS? Arron[employer]    Assessment & Plan     SRINI (generalized anxiety disorder)  Long standing, chronic, UNcontrolled and ready for next steps - official mental health referral for therapy placed but options for resources at school discussed with patient at length as well. Will also start low dose selective serotonin reuptake inhibitor, Prozac at 10 mg daily with option to increase further if needed/toelarted. May need additional as needed options pending above (propranolol vs buspar?), but will start slow; encouraged follow up in 4 weeks or sooner with any worsening or changes in symptoms.  - Adult Mental Health  Referral; Future  - FLUoxetine (PROZAC) 10 MG capsule; Take 1 capsule (10 mg) by mouth daily for 14 days, THEN 2 capsules (20 mg) daily for 30 days.    Review of prior external note(s) from - previous routine PCP and specialists notes  20 minutes spent on the date of the encounter doing chart review, history and exam, documentation and further activities per the note       Patient Instructions   Discussed the pathophysiology of anxiety/depression episodes and the various symptoms seen associated with anxiety episodes. Discussed possible triggers including fatigue, depression, stress, and chemicals such as alcohol, caffeine and certain drugs. Discussed the treatment including an aerobic exercise program, adequate rest, and both rescue meds and maintenance meds.   For your anxiety:   1. Consider therapy - CBT - cognitive behavioral therapy - Mental Health referral placed.  2. \"The Chemistry of Calm\" by Nehemias Dietrich   3. \"Hope and Help for your Nerves\" by Vicki Cordova   4. Vitamin D 6632-7240 IU daily   5. Valerian root extract for relaxation and sleep OR Melatonin at bedtime.  Discussed multifaceted approach to controlling " anxiety including self care, counseling, and medication.   Patient is interested in establishing with therapy and starting medication today. Will start fluoxetine (Prozac) 10 mg daily and titrate up to 20 mg daily (two 10 mg tabs at the same time) after the first 2 weeks if needed/tolerated.   Discussed side effects of SSRI including possible increase in suicide ideation in the first few weeks, pt knows to call crisis line or go to ER if this happens.  Discussed clinical effect often delayed until 4-6 weeks.   Discussed taking time for self and spending time with people who provide social support.  Patient to return to clinic in 1 month for follow up then 5-6 months for further refills or sooner with any worsening or changes in symptoms.        Did you know?      You can schedule a video visit for follow-up appointments as well as future appointments for certain conditions.  Please see the below link.     Video Visits ([email or web address])     If you have not already done so,  I encourage you to sign up for [employer] ([email or web address]).  This will allow you to review your results, securely communicate with a provider, and schedule virtual visits as well.      No follow-ups on file.    Ana Malik PA-C  [facility]    Amanda Benson is a 21 year old presenting for the following health issues:  No chief complaint on file.      HPI     Anxiety Follow-Up    How are you doing with your anxiety since your last visit? Worsened since school started    Are you having other symptoms that might be associated with anxiety? No    Have you had a significant life event? Relationship Concerns     Are you feeling depressed? No    Do you have any concerns with your use of alcohol or other drugs? No    History of anxiety off/on for some time, but feels worse since starting school.    Test anxiety especially troublesome.    Patient has never been on medicine before, ready for  next steps.    Plans look into counselors/therapist at school ([facility])      Review of Systems   Constitutional, HEENT, cardiovascular, pulmonary, GI, , musculoskeletal, neuro, skin, endocrine and psych systems are negative, except as otherwise noted.      Objective           Vitals:  No vitals were obtained today due to virtual visit.    Physical Exam   healthy, alert and no distress  PSYCH: Alert and oriented times 3; coherent speech, normal   rate and volume, able to articulate logical thoughts, able   to abstract reason, no tangential thoughts, no hallucinations   or delusions  Her affect is normal  RESP: No cough, no audible wheezing, able to talk in full sentences  Remainder of exam unable to be completed due to telephone visits            Phone call duration: 15 minutes

## 2022-09-30 NOTE — PATIENT INSTRUCTIONS
"Discussed the pathophysiology of anxiety/depression episodes and the various symptoms seen associated with anxiety episodes. Discussed possible triggers including fatigue, depression, stress, and chemicals such as alcohol, caffeine and certain drugs. Discussed the treatment including an aerobic exercise program, adequate rest, and both rescue meds and maintenance meds.   For your anxiety:   1. Consider therapy - CBT - cognitive behavioral therapy - Mental Health referral placed.  2. \"The Chemistry of Calm\" by Nehemias Dietrich   3. \"Hope and Help for your Nerves\" by Vicki Cordova   4. Vitamin D 8984-2839 IU daily   5. Valerian root extract for relaxation and sleep OR Melatonin at bedtime.  Discussed multifaceted approach to controlling anxiety including self care, counseling, and medication.   Patient is interested in establishing with therapy and starting medication today. Will start fluoxetine (Prozac) 10 mg daily and titrate up to 20 mg daily (two 10 mg tabs at the same time) after the first 2 weeks if needed/tolerated.   Discussed side effects of SSRI including possible increase in suicide ideation in the first few weeks, pt knows to call crisis line or go to ER if this happens.  Discussed clinical effect often delayed until 4-6 weeks.   Discussed taking time for self and spending time with people who provide social support.  Patient to return to clinic in 1 month for follow up then 5-6 months for further refills or sooner with any worsening or changes in symptoms.        Did you know?      You can schedule a video visit for follow-up appointments as well as future appointments for certain conditions.  Please see the below link.     Video Visits ([email or web address])     If you have not already done so,  I encourage you to sign up for [employer] ([email or web address]).  This will allow you to review your results, securely communicate with a provider, and schedule virtual visits as well.  "

## 2022-09-30 NOTE — LETTER
September 30, 2022      Kelley Martin  [address] [facility] [address]        To Whom It May Concern,      Patient needs more support with therapist/counseling services while at school due to recent diagnosis.    (F41.1) SRINI (generalized anxiety disorder)  (primary encounter diagnosis)      Sincerely,        Ana Malik PA-C

## 2022-10-01 ENCOUNTER — HEALTH MAINTENANCE LETTER (OUTPATIENT)
Age: 22
End: 2022-10-01

## 2022-10-24 ENCOUNTER — TRANSFERRED RECORDS (OUTPATIENT)
Dept: HEALTH INFORMATION MANAGEMENT | Facility: CLINIC | Age: 22
End: 2022-10-24

## 2022-12-05 ENCOUNTER — TRANSFERRED RECORDS (OUTPATIENT)
Dept: HEALTH INFORMATION MANAGEMENT | Facility: CLINIC | Age: 22
End: 2022-12-05

## 2023-01-26 DIAGNOSIS — Z30.019 ENCOUNTER FOR INITIAL PRESCRIPTION OF CONTRACEPTIVES, UNSPECIFIED CONTRACEPTIVE: ICD-10-CM

## 2023-01-26 RX ORDER — NORGESTIMATE AND ETHINYL ESTRADIOL 7DAYSX3 LO
1 KIT ORAL DAILY
Qty: 84 TABLET | Refills: 2 | Status: SHIPPED | OUTPATIENT
Start: 2023-01-26 | End: 2023-03-14

## 2023-01-27 NOTE — TELEPHONE ENCOUNTER
"Last Written Prescription Date:  5/14/2022  Last Fill Quantity: 84,  # refills: 2   Last office visit provider:  9/30/2022     Requested Prescriptions   Pending Prescriptions Disp Refills     norgestim-eth estrad triphasic (TRI-LO-SPRINTEC) 0.18/0.215/0.25 MG-25 MCG tablet 84 tablet 2     Sig: Take 1 tablet by mouth daily       Contraceptives Protocol Passed - 1/26/2023 10:17 AM        Passed - Patient is not a current smoker if age is 35 or older        Passed - Recent (12 mo) or future (30 days) visit within the authorizing provider's specialty     Patient has had an office visit with the authorizing provider or a provider within the authorizing providers department within the previous 12 mos or has a future within next 30 days. See \"Patient Info\" tab in inbasket, or \"Choose Columns\" in Meds & Orders section of the refill encounter.              Passed - Medication is active on med list        Passed - No active pregnancy on record        Passed - No positive pregnancy test in past 12 months             Sara Valladares RN 01/26/23 11:50 PM  "

## 2023-02-08 DIAGNOSIS — F41.1 GAD (GENERALIZED ANXIETY DISORDER): ICD-10-CM

## 2023-02-08 RX ORDER — FLUOXETINE 10 MG/1
CAPSULE ORAL
Qty: 74 CAPSULE | Refills: 0 | Status: SHIPPED | OUTPATIENT
Start: 2023-02-08 | End: 2023-03-13

## 2023-02-08 NOTE — TELEPHONE ENCOUNTER
"Requested Prescriptions   Pending Prescriptions Disp Refills     FLUoxetine (PROZAC) 10 MG capsule [Pharmacy Med Name: FLUOXETINE 10MG CAPSULES] 74 capsule 0     Sig: TAKE 1 CAPSULE(10 MG) BY MOUTH DAILY FOR 14 DAYS THEN TAKE 2 CAPSULES(20 MG) BY MOUTH DAILY FOR 30 DAYS       SSRIs Protocol Passed - 2/8/2023  3:16 AM        Passed - Recent (12 mo) or future (30 days) visit within the authorizing provider's specialty     Patient has had an office visit with the authorizing provider or a provider within the authorizing providers department within the previous 12 mos or has a future within next 30 days. See \"Patient Info\" tab in inbasket, or \"Choose Columns\" in Meds & Orders section of the refill encounter.              Passed - Medication is active on med list        Passed - Patient is age 18 or older        Passed - No active pregnancy on record        Passed - No positive pregnancy test in last 12 months           Prescription approved per [facility] Refill Protocol.    Elina Kearns RN  [facility]   "

## 2023-03-14 ENCOUNTER — NURSE TRIAGE (OUTPATIENT)
Dept: NURSING | Facility: CLINIC | Age: 23
End: 2023-03-14
Payer: COMMERCIAL

## 2023-03-14 DIAGNOSIS — Z30.019 ENCOUNTER FOR INITIAL PRESCRIPTION OF CONTRACEPTIVES, UNSPECIFIED CONTRACEPTIVE: ICD-10-CM

## 2023-03-14 RX ORDER — NORGESTIMATE AND ETHINYL ESTRADIOL 7DAYSX3 LO
1 KIT ORAL DAILY
Qty: 84 TABLET | Refills: 0 | Status: SHIPPED | OUTPATIENT
Start: 2023-03-14 | End: 2023-04-16

## 2023-03-14 NOTE — TELEPHONE ENCOUNTER
Pending Prescriptions:                       Disp   Refills    norgestim-eth estrad triphasic (TRI-LO-SP*84 tab*2            Sig: Take 1 tablet by mouth daily    Patient stated that pharmacy needs a new script from the Dr. Patient cant  till the 19 th of march but requesting it earlier pt is going out of town

## 2023-03-14 NOTE — TELEPHONE ENCOUNTER
"Pt asks if it is okay for her to skip 1 month of her birth control pill (Tri-Lo-Sprintec). Her last pill for this pack is on Saturday.   Plans to go out of town next week.    FNA advised it is okay to skip 1 month , advised to use another method of contraception while not on the pill. May also experience breakthrough bleeding or cramping if pt misses her doses.    Per [facility] website: \"If you don t take the hormone-free pills and instead take a break from pill-taking, you ll still get your period. Just make sure you remember to start your next pack on time, or you ll be at risk for pregnancy.\"    If PCP/care team has other or different recommendations, please call pt [phone], okay to leave detailed message.    Urvashi Doan RN/[place] Nurse Advisor      Reason for Disposition    Caller has NON-URGENT medicine question about med that PCP or specialist prescribed and triager unable to answer question    Protocols used: MEDICATION QUESTION CALL-A-[place]      "

## 2023-03-15 ENCOUNTER — TELEPHONE (OUTPATIENT)
Dept: FAMILY MEDICINE | Facility: CLINIC | Age: 23
End: 2023-03-15
Payer: COMMERCIAL

## 2023-03-15 NOTE — TELEPHONE ENCOUNTER
PA Initiation    Medication: norgestim-eth estrad triphasic (TRI-LO-SPRINTEC) 0.18/0.215/0.25 MG-25 MCG tablet  Insurance Company:  [employer]  Pharm[facility] Filling the Rx:  [facility]  Filling Pharmacy Phone:  [phone]  Filling Pharmacy Fax:  [phone]  Start Date:  03/14/2023

## 2023-03-17 NOTE — TELEPHONE ENCOUNTER
Prior Authorization Not Needed per Insurance    Medication: norgestim-eth estrad triphasic (TRI-LO-SPRINTEC) 0.18/0.215/0.25 MG-25 MCG tablet  Insurance Company: [employer] ([facility]) - Phone [phone] Fax [phone]  Expected CoPay:      [facility] [place] [address] [address] AT [place] [address]  Pharmacy Notified: No  Patient Notified: No    No PA needed, patient may need to have this filled at [facility] mail order pharmacy.

## 2023-03-17 NOTE — TELEPHONE ENCOUNTER
PA Initiation    Medication: norgestim-eth estrad triphasic (TRI-LO-SPRINTEC) 0.18/0.215/0.25 MG-25 MCG tablet  Insurance Company: [employer] ([facility]) - Phone [phone] Fax [phone]  Pharmacy Filling the Rx: [facility] [id number] [place] [address] [address] AT [place] [address]  Filling Pharmacy Phone: [phone]  Filling Pharmacy Fax: [phone]  Start Date: 3/17/2023

## 2023-03-17 NOTE — TELEPHONE ENCOUNTER
Patient calling back requesting that this be sent to a different fax number: [phone], in regards to insurance changes, insurance will be sending it to patients house, told to call and have the new rx faxed, to be able to send through the mail instead of in store.

## 2023-04-15 DIAGNOSIS — Z30.019 ENCOUNTER FOR INITIAL PRESCRIPTION OF CONTRACEPTIVES, UNSPECIFIED CONTRACEPTIVE: ICD-10-CM

## 2023-04-16 RX ORDER — NORGESTIMATE AND ETHINYL ESTRADIOL
KIT
Qty: 28 TABLET | Refills: 0 | Status: SHIPPED | OUTPATIENT
Start: 2023-04-16 | End: 2023-05-12

## 2023-04-16 NOTE — TELEPHONE ENCOUNTER
"Routing refill request to provider for review/approval because:  Patient needs to be seen because it has been more than 1 year since last office visit.    Last Written Prescription Date:  3/14/2023  Last Fill Quantity: 84,  # refills: 0   Last office visit provider:  1/23/2022     Requested Prescriptions   Pending Prescriptions Disp Refills     TRI-LO-SPRINTEC 0.18/0.215/0.25 MG-25 MCG tablet [Pharmacy Med Name: Tri-Lo-Sprintec 0.18/0.215/0.25 MG-25 MCG Oral Tablet] 28 tablet 11     Sig: TAKE 1 TABLET BY MOUTH DAILY       Contraceptives Protocol Failed - 4/15/2023  9:43 PM        Failed - Recent (12 mo) or future (30 days) visit within the authorizing provider's specialty     Patient has had an office visit with the authorizing provider or a provider within the authorizing providers department within the previous 12 mos or has a future within next 30 days. See \"Patient Info\" tab in inbasket, or \"Choose Columns\" in Meds & Orders section of the refill encounter.              Passed - Patient is not a current smoker if age is 35 or older        Passed - Medication is active on med list        Passed - No active pregnancy on record        Passed - No positive pregnancy test in past 12 months             Margaret Painting RN 04/16/23 8:27 AM      "

## 2023-07-26 ENCOUNTER — NURSE TRIAGE (OUTPATIENT)
Dept: NURSING | Facility: CLINIC | Age: 23
End: 2023-07-26
Payer: COMMERCIAL

## 2023-07-26 NOTE — TELEPHONE ENCOUNTER
Pt's mother calling, states pt is in [place] and has been diagnosed with genital herpes. Pt is now taking medications and using a cream.     Mother calling for general information on herpes (outbreaks, symptoms).    RN provided information from [email or web address]. No triage as pt is out of state and not present on phone call.     Mother verbalized understanding and had no further questions.      Sari Kaye RN  [place] Nurse Advisor  7/26/2023 6:59 PM     Reason for Disposition   Health Information question, no triage required and triager able to answer question    Additional Information   Negative: RN needs further essential information from caller in order to complete triage   Negative: Requesting regular office appointment   Negative: [1] Caller requesting NON-URGENT health information AND [2] PCP's office is the best resource    Protocols used: Information Only Call - No Triage-A-

## 2023-09-30 DIAGNOSIS — Z30.019 ENCOUNTER FOR INITIAL PRESCRIPTION OF CONTRACEPTIVES, UNSPECIFIED CONTRACEPTIVE: ICD-10-CM

## 2023-10-02 RX ORDER — NORGESTIMATE AND ETHINYL ESTRADIOL
KIT
Qty: 84 TABLET | Refills: 0 | Status: SHIPPED | OUTPATIENT
Start: 2023-10-02 | End: 2023-11-29

## 2023-10-21 ENCOUNTER — HEALTH MAINTENANCE LETTER (OUTPATIENT)
Age: 23
End: 2023-10-21

## 2023-11-28 DIAGNOSIS — Z30.019 ENCOUNTER FOR INITIAL PRESCRIPTION OF CONTRACEPTIVES, UNSPECIFIED CONTRACEPTIVE: ICD-10-CM

## 2023-11-29 RX ORDER — NORGESTIMATE AND ETHINYL ESTRADIOL
KIT
Qty: 84 TABLET | Refills: 3 | Status: SHIPPED | OUTPATIENT
Start: 2023-11-29

## 2024-12-08 ENCOUNTER — HEALTH MAINTENANCE LETTER (OUTPATIENT)
Age: 24
End: 2024-12-08

## 2024-12-09 ENCOUNTER — MYC MEDICAL ADVICE (OUTPATIENT)
Dept: PHARMACY | Facility: CLINIC | Age: 24
End: 2024-12-09
Payer: COMMERCIAL

## 2024-12-09 DIAGNOSIS — B37.31 YEAST VAGINITIS: Primary | ICD-10-CM

## 2024-12-09 RX ORDER — FLUCONAZOLE 150 MG/1
150 TABLET ORAL ONCE
Qty: 1 TABLET | Refills: 0 | Status: SHIPPED | OUTPATIENT
Start: 2024-12-09 | End: 2024-12-09

## 2025-01-29 DIAGNOSIS — Z30.019 ENCOUNTER FOR INITIAL PRESCRIPTION OF CONTRACEPTIVES, UNSPECIFIED CONTRACEPTIVE: ICD-10-CM

## 2025-01-30 RX ORDER — NORGESTIMATE AND ETHINYL ESTRADIOL 7DAYSX3 LO
1 KIT ORAL DAILY
Qty: 84 TABLET | Refills: 0 | Status: SHIPPED | OUTPATIENT
Start: 2025-01-30

## 2025-03-29 DIAGNOSIS — Z30.019 ENCOUNTER FOR INITIAL PRESCRIPTION OF CONTRACEPTIVES, UNSPECIFIED CONTRACEPTIVE: ICD-10-CM

## 2025-03-31 RX ORDER — NORGESTIMATE AND ETHINYL ESTRADIOL 7DAYSX3 LO
1 KIT ORAL DAILY
Qty: 84 TABLET | Refills: 0 | Status: SHIPPED | OUTPATIENT
Start: 2025-03-31